# Patient Record
Sex: MALE | URBAN - METROPOLITAN AREA
[De-identification: names, ages, dates, MRNs, and addresses within clinical notes are randomized per-mention and may not be internally consistent; named-entity substitution may affect disease eponyms.]

---

## 2022-03-23 ENCOUNTER — OFFICE VISIT (OUTPATIENT)
Dept: URBAN - METROPOLITAN AREA CLINIC 25 | Facility: CLINIC | Age: 72
End: 2022-03-23
Payer: MEDICARE

## 2022-03-23 ENCOUNTER — DASHBOARD ENCOUNTERS (OUTPATIENT)
Age: 72
End: 2022-03-23

## 2022-03-23 VITALS
DIASTOLIC BLOOD PRESSURE: 89 MMHG | TEMPERATURE: 97.7 F | HEART RATE: 56 BPM | WEIGHT: 256 LBS | HEIGHT: 74 IN | BODY MASS INDEX: 32.85 KG/M2 | SYSTOLIC BLOOD PRESSURE: 167 MMHG

## 2022-03-23 DIAGNOSIS — R68.81 EARLY SATIETY: ICD-10-CM

## 2022-03-23 DIAGNOSIS — R93.3 ABNORMAL CT SCAN, STOMACH: ICD-10-CM

## 2022-03-23 PROCEDURE — 99204 OFFICE O/P NEW MOD 45 MIN: CPT | Performed by: INTERNAL MEDICINE

## 2022-03-23 RX ORDER — ISOSORBIDE MONONITRATE 30 MG/1
TAKE ONE TABLET BY MOUTH ONE TIME DAILY TABLET, EXTENDED RELEASE ORAL
Qty: 90 | Refills: 2 | Status: ACTIVE | COMMUNITY

## 2022-03-23 RX ORDER — FUROSEMIDE 40 MG/1
TAKE ONE TABLET BY MOUTH ONE TIME DAILY TABLET ORAL
Qty: 90 | Refills: 2 | Status: ACTIVE | COMMUNITY

## 2022-03-23 RX ORDER — GLIPIZIDE 5 MG/1
TAKE ONE TABLET BY MOUTH ONE TIME DAILY TABLET ORAL
Qty: 90 | Refills: 2 | Status: ACTIVE | COMMUNITY

## 2022-03-23 RX ORDER — POTASSIUM CHLORIDE 20 MEQ/1
TAKE TWO TABLETS BY MOUTH ONE TIME DAILY FOR LOW POTASSIUM TABLET, EXTENDED RELEASE ORAL
Qty: 180 | Refills: 1 | Status: ACTIVE | COMMUNITY

## 2022-03-23 RX ORDER — LOSARTAN POTASSIUM 100 MG/1
TABLET, FILM COATED ORAL
Qty: 90 | Status: ACTIVE | COMMUNITY

## 2022-03-23 RX ORDER — HYDROCHLOROTHIAZIDE 12.5 MG/1
TAKE TWO TABLETS BY MOUTH ONE TIME DAILY TABLET ORAL
Qty: 90 | Refills: 0 | Status: ACTIVE | COMMUNITY

## 2022-03-23 RX ORDER — HYDRALAZINE HYDROCHLORIDE 100 MG/1
TAKE ONE TABLET BY MOUTH THREE TIMES A DAY TABLET, FILM COATED ORAL
Qty: 270 | Refills: 0 | Status: ACTIVE | COMMUNITY

## 2022-03-23 RX ORDER — ATORVASTATIN CALCIUM 80 MG/1
TAKE ONE TABLET BY MOUTH AT BEDTIME TABLET, FILM COATED ORAL
Qty: 90 | Refills: 2 | Status: ACTIVE | COMMUNITY

## 2022-03-23 RX ORDER — SPIRONOLACTONE 25 MG/1
TAKE ONE TABLET BY MOUTH ONE TIME DAILY TABLET ORAL
Qty: 90 | Refills: 0 | Status: ACTIVE | COMMUNITY

## 2022-03-23 RX ORDER — CARVEDILOL 25 MG/1
TAKE ONE TABLET BY MOUTH TWICE A DAY TABLET, FILM COATED ORAL
Qty: 180 | Refills: 0 | Status: ACTIVE | COMMUNITY

## 2022-03-23 NOTE — HPI-TODAY'S VISIT:
The patient was referred by Dr. Tatyana Harkins for possible esophageal cancer .   A copy of this document is being forwarded to the referring provider.   He had a PET Scan with Dr. Harkins.  Per the daughter it had LAD near the [facility] concerning for malignancy.  They were originally seeing her for prior DVT.  He denies anroexia or weight loss.  He denies abdominal pain.  He denies GERD/N/V/dysphagia.  He has early satiety.  He has a BM every 2-3 days.  He denies poor emtying or strain. He denies LGI bleed or melena.  He is on Xarelto for prior DVT in 2020.  He has not had prior EGD or colonoscopy.  His PCP is Dr. Michelle

## 2022-03-28 ENCOUNTER — CLAIMS CREATED FROM THE CLAIM WINDOW (OUTPATIENT)
Dept: URBAN - METROPOLITAN AREA CLINIC 4 | Facility: CLINIC | Age: 72
End: 2022-03-28
Payer: MEDICARE

## 2022-03-28 ENCOUNTER — OFFICE VISIT (OUTPATIENT)
Dept: URBAN - METROPOLITAN AREA SURGERY CENTER 20 | Facility: SURGERY CENTER | Age: 72
End: 2022-03-28
Payer: MEDICARE

## 2022-03-28 ENCOUNTER — WEB ENCOUNTER (OUTPATIENT)
Dept: URBAN - METROPOLITAN AREA SURGERY CENTER 20 | Facility: SURGERY CENTER | Age: 72
End: 2022-03-28

## 2022-03-28 ENCOUNTER — TELEPHONE ENCOUNTER (OUTPATIENT)
Dept: URBAN - METROPOLITAN AREA CLINIC 92 | Facility: CLINIC | Age: 72
End: 2022-03-28

## 2022-03-28 DIAGNOSIS — B96.81 BACTERIAL INFECTION DUE TO H. PYLORI: ICD-10-CM

## 2022-03-28 DIAGNOSIS — K29.60 OTHER GASTRITIS WITHOUT BLEEDING: ICD-10-CM

## 2022-03-28 DIAGNOSIS — B96.81 HELICOBACTER PYLORI [H. PYLORI] AS THE CAUSE OF DISEASES CLASSIFIED ELSEWHERE: ICD-10-CM

## 2022-03-28 DIAGNOSIS — K29.60 ADENOPAPILLOMATOSIS GASTRICA: ICD-10-CM

## 2022-03-28 PROCEDURE — 43239 EGD BIOPSY SINGLE/MULTIPLE: CPT | Performed by: INTERNAL MEDICINE

## 2022-03-28 PROCEDURE — 88342 IMHCHEM/IMCYTCHM 1ST ANTB: CPT | Performed by: PATHOLOGY

## 2022-03-28 PROCEDURE — 88305 TISSUE EXAM BY PATHOLOGIST: CPT | Performed by: PATHOLOGY

## 2022-03-28 PROCEDURE — G8907 PT DOC NO EVENTS ON DISCHARG: HCPCS | Performed by: INTERNAL MEDICINE

## 2022-03-28 RX ORDER — LOSARTAN POTASSIUM 100 MG/1
TABLET, FILM COATED ORAL
Qty: 90 | Status: ACTIVE | COMMUNITY

## 2022-03-28 RX ORDER — ATORVASTATIN CALCIUM 80 MG/1
TAKE ONE TABLET BY MOUTH AT BEDTIME TABLET, FILM COATED ORAL
Qty: 90 | Refills: 2 | Status: ACTIVE | COMMUNITY

## 2022-03-28 RX ORDER — POTASSIUM CHLORIDE 20 MEQ/1
TAKE TWO TABLETS BY MOUTH ONE TIME DAILY FOR LOW POTASSIUM TABLET, EXTENDED RELEASE ORAL
Qty: 180 | Refills: 1 | Status: ACTIVE | COMMUNITY

## 2022-03-28 RX ORDER — GLIPIZIDE 5 MG/1
TAKE ONE TABLET BY MOUTH ONE TIME DAILY TABLET ORAL
Qty: 90 | Refills: 2 | Status: ACTIVE | COMMUNITY

## 2022-03-28 RX ORDER — HYDRALAZINE HYDROCHLORIDE 100 MG/1
TAKE ONE TABLET BY MOUTH THREE TIMES A DAY TABLET, FILM COATED ORAL
Qty: 270 | Refills: 0 | Status: ACTIVE | COMMUNITY

## 2022-03-28 RX ORDER — POLYETHYLENE GLYCOL 3350, SODIUM CHLORIDE, SODIUM BICARBONATE, POTASSIUM CHLORIDE 420; 11.2; 5.72; 1.48 G/4L; G/4L; G/4L; G/4L
AS DIRECTED POWDER, FOR SOLUTION ORAL ONCE
Qty: 1 BOTTLE | Refills: 0 | OUTPATIENT
Start: 2022-03-28 | End: 2022-03-29

## 2022-03-28 RX ORDER — CARVEDILOL 25 MG/1
TAKE ONE TABLET BY MOUTH TWICE A DAY TABLET, FILM COATED ORAL
Qty: 180 | Refills: 0 | Status: ACTIVE | COMMUNITY

## 2022-03-28 RX ORDER — FUROSEMIDE 40 MG/1
TAKE ONE TABLET BY MOUTH ONE TIME DAILY TABLET ORAL
Qty: 90 | Refills: 2 | Status: ACTIVE | COMMUNITY

## 2022-03-28 RX ORDER — HYDROCHLOROTHIAZIDE 12.5 MG/1
TAKE TWO TABLETS BY MOUTH ONE TIME DAILY TABLET ORAL
Qty: 90 | Refills: 0 | Status: ACTIVE | COMMUNITY

## 2022-03-28 RX ORDER — ISOSORBIDE MONONITRATE 30 MG/1
TAKE ONE TABLET BY MOUTH ONE TIME DAILY TABLET, EXTENDED RELEASE ORAL
Qty: 90 | Refills: 2 | Status: ACTIVE | COMMUNITY

## 2022-03-28 RX ORDER — SPIRONOLACTONE 25 MG/1
TAKE ONE TABLET BY MOUTH ONE TIME DAILY TABLET ORAL
Qty: 90 | Refills: 0 | Status: ACTIVE | COMMUNITY

## 2022-04-08 ENCOUNTER — TELEPHONE ENCOUNTER (OUTPATIENT)
Dept: URBAN - METROPOLITAN AREA CLINIC 92 | Facility: CLINIC | Age: 72
End: 2022-04-08

## 2022-04-08 RX ORDER — LANSOPRAZOLE 30 MG/1
1 CAPSULE CAPSULE, DELAYED RELEASE ORAL
Qty: 28 CAPSULE | Refills: 0 | OUTPATIENT
Start: 2022-04-08

## 2022-04-08 RX ORDER — AMOXICILLIN 500 MG/1
2 CAPSULES CAPSULE ORAL
Qty: 56 CAPSULE | OUTPATIENT
Start: 2022-04-08 | End: 2022-04-22

## 2022-04-08 RX ORDER — CLARITHROMYCIN 500 MG/1
1 TABLET TABLET, FILM COATED ORAL
Qty: 28 TABLET | OUTPATIENT
Start: 2022-04-08 | End: 2022-04-22

## 2022-04-08 RX ORDER — LOSARTAN POTASSIUM 100 MG/1
TABLET, FILM COATED ORAL
Qty: 90 | Status: ACTIVE | COMMUNITY

## 2022-04-08 RX ORDER — GLIPIZIDE 5 MG/1
TAKE ONE TABLET BY MOUTH ONE TIME DAILY TABLET ORAL
Qty: 90 | Refills: 2 | Status: ACTIVE | COMMUNITY

## 2022-04-08 RX ORDER — FUROSEMIDE 40 MG/1
TAKE ONE TABLET BY MOUTH ONE TIME DAILY TABLET ORAL
Qty: 90 | Refills: 2 | Status: ACTIVE | COMMUNITY

## 2022-04-08 RX ORDER — POTASSIUM CHLORIDE 20 MEQ/1
TAKE TWO TABLETS BY MOUTH ONE TIME DAILY FOR LOW POTASSIUM TABLET, EXTENDED RELEASE ORAL
Qty: 180 | Refills: 1 | Status: ACTIVE | COMMUNITY

## 2022-04-08 RX ORDER — SPIRONOLACTONE 25 MG/1
TAKE ONE TABLET BY MOUTH ONE TIME DAILY TABLET ORAL
Qty: 90 | Refills: 0 | Status: ACTIVE | COMMUNITY

## 2022-04-08 RX ORDER — HYDRALAZINE HYDROCHLORIDE 100 MG/1
TAKE ONE TABLET BY MOUTH THREE TIMES A DAY TABLET, FILM COATED ORAL
Qty: 270 | Refills: 0 | Status: ACTIVE | COMMUNITY

## 2022-04-08 RX ORDER — HYDROCHLOROTHIAZIDE 12.5 MG/1
TAKE TWO TABLETS BY MOUTH ONE TIME DAILY TABLET ORAL
Qty: 90 | Refills: 0 | Status: ACTIVE | COMMUNITY

## 2022-04-08 RX ORDER — CARVEDILOL 25 MG/1
TAKE ONE TABLET BY MOUTH TWICE A DAY TABLET, FILM COATED ORAL
Qty: 180 | Refills: 0 | Status: ACTIVE | COMMUNITY

## 2022-04-08 RX ORDER — POLYETHYLENE GLYCOL 3350, SODIUM CHLORIDE, SODIUM BICARBONATE, POTASSIUM CHLORIDE 420; 11.2; 5.72; 1.48 G/4L; G/4L; G/4L; G/4L
AS DIRECTED POWDER, FOR SOLUTION ORAL ONCE
Qty: 1 BOTTLE | Refills: 0 | OUTPATIENT
Start: 2022-04-25 | End: 2022-04-26

## 2022-04-08 RX ORDER — ISOSORBIDE MONONITRATE 30 MG/1
TAKE ONE TABLET BY MOUTH ONE TIME DAILY TABLET, EXTENDED RELEASE ORAL
Qty: 90 | Refills: 2 | Status: ACTIVE | COMMUNITY

## 2022-04-08 RX ORDER — ATORVASTATIN CALCIUM 80 MG/1
TAKE ONE TABLET BY MOUTH AT BEDTIME TABLET, FILM COATED ORAL
Qty: 90 | Refills: 2 | Status: ACTIVE | COMMUNITY

## 2022-05-23 ENCOUNTER — CLAIMS CREATED FROM THE CLAIM WINDOW (OUTPATIENT)
Dept: URBAN - METROPOLITAN AREA SURGERY CENTER 20 | Facility: SURGERY CENTER | Age: 72
End: 2022-05-23

## 2022-05-23 ENCOUNTER — CLAIMS CREATED FROM THE CLAIM WINDOW (OUTPATIENT)
Dept: URBAN - METROPOLITAN AREA SURGERY CENTER 20 | Facility: SURGERY CENTER | Age: 72
End: 2022-05-23
Payer: MEDICARE

## 2022-05-23 ENCOUNTER — CLAIMS CREATED FROM THE CLAIM WINDOW (OUTPATIENT)
Dept: URBAN - METROPOLITAN AREA CLINIC 4 | Facility: CLINIC | Age: 72
End: 2022-05-23
Payer: MEDICARE

## 2022-05-23 DIAGNOSIS — D12.2 ADENOMA OF ASCENDING COLON: ICD-10-CM

## 2022-05-23 DIAGNOSIS — D12.2 BENIGN NEOPLASM OF ASCENDING COLON: ICD-10-CM

## 2022-05-23 DIAGNOSIS — Z12.11 COLON CANCER SCREENING: ICD-10-CM

## 2022-05-23 PROCEDURE — 45385 COLONOSCOPY W/LESION REMOVAL: CPT | Performed by: INTERNAL MEDICINE

## 2022-05-23 PROCEDURE — 88305 TISSUE EXAM BY PATHOLOGIST: CPT | Performed by: PATHOLOGY

## 2022-05-23 PROCEDURE — G8907 PT DOC NO EVENTS ON DISCHARG: HCPCS | Performed by: INTERNAL MEDICINE

## 2022-05-23 RX ORDER — LOSARTAN POTASSIUM 100 MG/1
TABLET, FILM COATED ORAL
Qty: 90 | Status: ACTIVE | COMMUNITY

## 2022-05-23 RX ORDER — FUROSEMIDE 40 MG/1
TAKE ONE TABLET BY MOUTH ONE TIME DAILY TABLET ORAL
Qty: 90 | Refills: 2 | Status: ACTIVE | COMMUNITY

## 2022-05-23 RX ORDER — LANSOPRAZOLE 30 MG/1
1 CAPSULE CAPSULE, DELAYED RELEASE ORAL
Qty: 28 CAPSULE | Refills: 0 | Status: ACTIVE | COMMUNITY
Start: 2022-04-08

## 2022-05-23 RX ORDER — CARVEDILOL 25 MG/1
TAKE ONE TABLET BY MOUTH TWICE A DAY TABLET, FILM COATED ORAL
Qty: 180 | Refills: 0 | Status: ACTIVE | COMMUNITY

## 2022-05-23 RX ORDER — GLIPIZIDE 5 MG/1
TAKE ONE TABLET BY MOUTH ONE TIME DAILY TABLET ORAL
Qty: 90 | Refills: 2 | Status: ACTIVE | COMMUNITY

## 2022-05-23 RX ORDER — HYDROCHLOROTHIAZIDE 12.5 MG/1
TAKE TWO TABLETS BY MOUTH ONE TIME DAILY TABLET ORAL
Qty: 90 | Refills: 0 | Status: ACTIVE | COMMUNITY

## 2022-05-23 RX ORDER — ISOSORBIDE MONONITRATE 30 MG/1
TAKE ONE TABLET BY MOUTH ONE TIME DAILY TABLET, EXTENDED RELEASE ORAL
Qty: 90 | Refills: 2 | Status: ACTIVE | COMMUNITY

## 2022-05-23 RX ORDER — ATORVASTATIN CALCIUM 80 MG/1
TAKE ONE TABLET BY MOUTH AT BEDTIME TABLET, FILM COATED ORAL
Qty: 90 | Refills: 2 | Status: ACTIVE | COMMUNITY

## 2022-05-23 RX ORDER — SPIRONOLACTONE 25 MG/1
TAKE ONE TABLET BY MOUTH ONE TIME DAILY TABLET ORAL
Qty: 90 | Refills: 0 | Status: ACTIVE | COMMUNITY

## 2022-05-23 RX ORDER — POTASSIUM CHLORIDE 20 MEQ/1
TAKE TWO TABLETS BY MOUTH ONE TIME DAILY FOR LOW POTASSIUM TABLET, EXTENDED RELEASE ORAL
Qty: 180 | Refills: 1 | Status: ACTIVE | COMMUNITY

## 2022-05-23 RX ORDER — HYDRALAZINE HYDROCHLORIDE 100 MG/1
TAKE ONE TABLET BY MOUTH THREE TIMES A DAY TABLET, FILM COATED ORAL
Qty: 270 | Refills: 0 | Status: ACTIVE | COMMUNITY

## 2022-06-28 ENCOUNTER — TELEPHONE ENCOUNTER (OUTPATIENT)
Dept: URBAN - METROPOLITAN AREA CLINIC 92 | Facility: CLINIC | Age: 72
End: 2022-06-28

## 2022-07-07 PROBLEM — 196731005 GASTRODUODENITIS: Status: ACTIVE | Noted: 2022-07-07

## 2024-02-01 NOTE — PHYSICAL EXAM SKIN:
MD Notification    Notified Person: MD    Notified Person Name:  Dr. Rodriguez    Notification Date/Time:  2304    Notification Interaction:  Vocera Page    Purpose of Notification:      BG recheck was 354, after 7 units insulin given for BG of 365. Do you want to give any additional units?       Orders Received:  One time dose 3 units       no rashes , no suspicious lesions , no areas of discoloration , no jaundice present , good turgor , no masses , no tenderness on palpation